# Patient Record
Sex: MALE | URBAN - METROPOLITAN AREA
[De-identification: names, ages, dates, MRNs, and addresses within clinical notes are randomized per-mention and may not be internally consistent; named-entity substitution may affect disease eponyms.]

---

## 2022-08-12 ENCOUNTER — OFFICE VISIT (OUTPATIENT)
Dept: URBAN - METROPOLITAN AREA CLINIC 28 | Facility: CLINIC | Age: 27
End: 2022-08-12
Payer: COMMERCIAL

## 2022-08-12 DIAGNOSIS — H16.133 PHOTOKERATITIS, BILATERAL: Primary | ICD-10-CM

## 2022-08-12 PROCEDURE — 92134 CPTRZ OPH DX IMG PST SGM RTA: CPT | Performed by: OPTOMETRIST

## 2022-08-12 PROCEDURE — 92004 COMPRE OPH EXAM NEW PT 1/>: CPT | Performed by: OPTOMETRIST

## 2022-08-12 ASSESSMENT — INTRAOCULAR PRESSURE
OS: 18
OD: 18

## 2022-08-12 NOTE — IMPRESSION/PLAN
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 Plan: Educated on exam findings. 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 Pt states he just ordered new mask with higher level of protection. Recommend Systane complete ATs Q2H OU and Systane gel QHS OU for acute episodes. Baseline macula OCT taken today with normal macula/foveal anatomy. 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 Monitor annually or sooner with any changes in vision/symptoms.